# Patient Record
Sex: FEMALE | Race: WHITE | Employment: UNEMPLOYED | URBAN - METROPOLITAN AREA
[De-identification: names, ages, dates, MRNs, and addresses within clinical notes are randomized per-mention and may not be internally consistent; named-entity substitution may affect disease eponyms.]

---

## 2017-10-04 ENCOUNTER — OFFICE VISIT (OUTPATIENT)
Dept: VASCULAR SURGERY | Age: 47
End: 2017-10-04
Payer: COMMERCIAL

## 2017-10-04 VITALS
HEART RATE: 91 BPM | BODY MASS INDEX: 34.15 KG/M2 | TEMPERATURE: 98.1 F | RESPIRATION RATE: 16 BRPM | DIASTOLIC BLOOD PRESSURE: 96 MMHG | WEIGHT: 200 LBS | HEIGHT: 64 IN | SYSTOLIC BLOOD PRESSURE: 143 MMHG

## 2017-10-04 DIAGNOSIS — I83.92 VARICOSE VEINS OF LEFT LOWER EXTREMITY: Chronic | ICD-10-CM

## 2017-10-04 PROCEDURE — 99203 OFFICE O/P NEW LOW 30 MIN: CPT | Performed by: SURGERY

## 2017-10-04 RX ORDER — METHOCARBAMOL 750 MG/1
750 TABLET, FILM COATED ORAL DAILY
COMMUNITY
Start: 2017-09-06

## 2017-10-04 RX ORDER — BUPROPION HYDROCHLORIDE 150 MG/1
150 TABLET ORAL EVERY MORNING
COMMUNITY
Start: 2017-09-27

## 2017-10-04 RX ORDER — CLONAZEPAM 1 MG/1
1 TABLET ORAL 2 TIMES DAILY PRN
COMMUNITY
Start: 2017-09-14

## 2017-10-04 RX ORDER — OMEPRAZOLE 40 MG/1
40 CAPSULE, DELAYED RELEASE ORAL DAILY
COMMUNITY
Start: 2017-09-29

## 2017-10-04 RX ORDER — HYDROCODONE BITARTRATE AND ACETAMINOPHEN 5; 325 MG/1; MG/1
1 TABLET ORAL DAILY
COMMUNITY
Start: 2017-09-09

## 2017-10-04 RX ORDER — RANITIDINE 150 MG/1
150 TABLET ORAL NIGHTLY
COMMUNITY
Start: 2017-09-01

## 2017-10-04 RX ORDER — LISINOPRIL AND HYDROCHLOROTHIAZIDE 25; 20 MG/1; MG/1
1 TABLET ORAL DAILY
COMMUNITY
Start: 2017-09-26

## 2017-10-04 RX ORDER — GLIPIZIDE 10 MG/1
10 TABLET ORAL DAILY
COMMUNITY
Start: 2017-09-29

## 2017-10-04 RX ORDER — LORATADINE 10 MG/1
10 TABLET ORAL DAILY
COMMUNITY
Start: 2017-10-03

## 2017-10-04 RX ORDER — CYANOCOBALAMIN 1000 UG/ML
1000 INJECTION INTRAMUSCULAR; SUBCUTANEOUS
COMMUNITY
Start: 2017-09-27

## 2017-10-04 RX ORDER — OLANZAPINE 10 MG/1
10 TABLET ORAL NIGHTLY
COMMUNITY
Start: 2017-09-21

## 2017-10-04 NOTE — PROGRESS NOTES
[id number] [facility] PHYSICIANS  SPECIALIST OUTREACH FLAVIO Mccarthy  1970  41 y. o.female       Chief Complaint:  Chief Complaint   Patient presents with    Varicose Veins     New patient ref for evaluation and treatment . .. had ultrasound done three years ago but reports left calf pain , and veins protruding        History of present Illness:  Pt is here today for evaluation and treatment of varicose veins of her left leg. She is of the understanding that she has venous reflux in her left short saphenous vein from a study done several years ago. She notes more prominent veins in her left calf and a feeling that when she walks she has a pulling sensation from her calf to her ankle. She denies any leg swelling, no discoloration, and mild discomfort on her left posterior calf. Past Medical History:  has a past medical history of Allergic rhinitis; Anxiety; Bipolar disorder ([address] Chronic back pain; COPD (chronic obstructive pulmonary disease) ([address] Depression; GERD (gastroesophageal reflux disease); Hypertension; Neuropathy ([address] Obesity; Type 2 diabetes mellitus without complication ([address] and Urinary incontinence. Past Surgical History:   Past Surgical History:   Procedure Laterality Date     SECTION      ELBOW FRACTURE SURGERY         Social History:  reports that she has been smoking Cigarettes. She has been smoking about 1.00 pack per day. She does not have any smokeless tobacco history on file. She reports that she drinks alcohol. Family History: family history is not on file.     Review of Systems:   Constitutional:  negative for  fevers, chills, sweats, fatigue, and weight loss  HEENT:  negative for vision or hearing changes,   Respiratory:  negative for shortness of breath, cough, or congestion  Cardiovascular:  negative for  chest pain, palpitations  Gastrointestinal:  negative for nausea, vomiting, diarrhea, constipation, abdominal reactive, extraocular eye movements intact, conjunctiva clear  Ears - hearing appears to be intact  Nose - no drainage noted  Mouth - mucous membranes moist  Neck - supple, no carotid bruits, thyroid not palpable, no JVD  Chest - clear to auscultation, normal effort  Heart - normal rate, regular rhythm, no murmurs  Abdomen - soft, non-tender, non-distended, bowel sounds present all four quadrants, no masses, hepatomegaly, splenomegaly or aortic enlargement  Neurological - normal speech, no focal findings or movement disorder noted, cranial nerves II through XII grossly intact  Extremities - peripheral pulses palpable, no pedal edema. Mild calf pain, no large varicosities seen  Skin - no gross lesions, rashes, or induration noted      R brachial 2+ L brachial 2+   R radial 2+ L radial 2+   R femoral 2+ L femoral 2+   R popliteal 2+ L popliteal 2+   R posterior tibial 2+ L posterior tibial 2+   R dorsalis pedis 2+ L dorsalis pedis 2+       Assessment:  1. 41 yr old female with pain of the left calf, concern for varicose veins    1. Varicose veins of left lower extremity        Plan:   1. Based on her physical exam findings I have no concern for significant venous disease. Her last venous study was evaluated and showed NO venous reflux. 2. I feel her discomfort is self limited and may represent superficial phlebitis. A venous study will be repeated.     Orders Placed This Encounter   Procedures    Reflux study/Reflux Venous Insufficiency Bilateral           Electronically signed by Susy Reaves MD on 10/4/2017 at 12:11 PM     Copy sent to Dr. Janeth Brooks, DO

## 2017-10-04 NOTE — MR AVS SNAPSHOT
Instructions      SURVEY:    You may be receiving a survey from [employer] regarding your visit today. Please complete the survey to enable us to provide the highest quality of care to you and your family. If you cannot score us a very good on any question, please call the office to discuss how we could of made your experience a very good one. Thank you. Medications and Orders      Your Current Medications Are              VENTOLIN  (90 Base) MCG/ACT inhaler 1 puff 2 times daily     buPROPion (WELLBUTRIN XL) 150 MG extended release tablet 150 mg every morning     clonazePAM (KLONOPIN) 1 MG tablet 1 mg 2 times daily as needed     cyanocobalamin 1000 MCG/ML injection 1,000 mcg Takes monthly-    glipiZIDE (GLUCOTROL) 10 MG tablet 10 mg daily     metFORMIN (GLUCOPHAGE) 1000 MG tablet 1,000 mg daily (with breakfast)     loratadine (CLARITIN) 10 MG tablet 10 mg daily     lisinopril-hydrochlorothiazide (PRINZIDE;ZESTORETIC) 20-25 MG per tablet 1 tablet daily     HYDROcodone-acetaminophen (NORCO) 5-325 MG per tablet 1 tablet daily  . ranitidine (ZANTAC) 150 MG tablet 150 mg nightly     omeprazole (PRILOSEC) 40 MG delayed release capsule 40 mg daily     OLANZapine (ZYPREXA) 10 MG tablet 10 mg nightly     norethindrone (AYGESTIN) 5 MG tablet Take 10 mg by mouth daily     methocarbamol (ROBAXIN) 750 MG tablet 750 mg daily       Allergies           No Known Allergies         Additional Information        Basic Information     Date Of Birth Sex Race Ethnicity Preferred Language    1970 Female White Non-/Non  English      Immunizations as of 10/4/2017     Name Date    Influenza Virus Vaccine 9/23/2017    Pneumococcal Polysaccharide (Ewewajsdw65) 9/13/2017      Preventive Care        Date Due    HIV screening is recommended for all people regardless of risk factors  aged 15-65 years at least once (lifetime) who have never been HIV tested.  3/29/1987 Tetanus Combination Vaccine (1 - Tdap) 3/29/1991    Pap Smear 3/29/1993    Cholesterol Screening 3/29/2012    Diabetes Screening 3/29/2012            MyChart Signup           [employer] allows you to send messages to your doctor, view your test results, renew your prescriptions, schedule appointments, view visit notes, and more. How Do I Sign Up? 1. In your Internet browser, go to [email or web address]  2. Click on the Sign Up Now link in the Sign In box. You will see the New Member Sign Up page. 3. Enter your [employer] Access Code exactly as it appears below. You will not need to use this code after youve completed the sign-up process. If you do not sign up before the expiration date, you must request a new code. [employer] Access Code: [email or web address]  Expires: 12/3/2017 10:04 AM    4. Enter your Social Security Number ([id number]) and Date of Birth (mm/dd/yyyy) as indicated and click Submit. You will be taken to the next sign-up page. 5. Create a [employer] [facility] ID. This will be your [employer] [facility] login ID and cannot be changed, so think of one that is secure and easy to remember. 6. Create a [employer] password. You can change your password at any time. 7. Enter your Password Reset Question and Answer. This can be used at a later time if you forget your password. 8. Enter your e-mail address. You will receive e-mail notification when new information is available in [address]. 9. Click Sign Up. You can now view your medical record. Additional Information  If you have questions, please contact the physician practice where you receive care. Remember, [employer] is NOT to be used for urgent needs. For medical emergencies, dial 911. For questions regarding your [employer] account call [phone]. If you have a clinical question, please call your doctor's office.